# Patient Record
Sex: MALE | Race: WHITE | Employment: FULL TIME | URBAN - METROPOLITAN AREA
[De-identification: names, ages, dates, MRNs, and addresses within clinical notes are randomized per-mention and may not be internally consistent; named-entity substitution may affect disease eponyms.]

---

## 2018-03-13 ENCOUNTER — APPOINTMENT (OUTPATIENT)
Dept: CT IMAGING | Facility: CLINIC | Age: 53
End: 2018-03-13
Attending: PHYSICIAN ASSISTANT
Payer: COMMERCIAL

## 2018-03-13 ENCOUNTER — HOSPITAL ENCOUNTER (EMERGENCY)
Facility: CLINIC | Age: 53
Discharge: HOME | End: 2018-03-13
Attending: PHYSICIAN ASSISTANT | Admitting: PHYSICIAN ASSISTANT
Payer: COMMERCIAL

## 2018-03-13 ENCOUNTER — APPOINTMENT (OUTPATIENT)
Dept: ULTRASOUND IMAGING | Facility: CLINIC | Age: 53
End: 2018-03-13
Attending: PHYSICIAN ASSISTANT
Payer: COMMERCIAL

## 2018-03-13 VITALS
DIASTOLIC BLOOD PRESSURE: 97 MMHG | RESPIRATION RATE: 18 BRPM | WEIGHT: 185 LBS | OXYGEN SATURATION: 97 % | HEIGHT: 64 IN | TEMPERATURE: 98.6 F | SYSTOLIC BLOOD PRESSURE: 166 MMHG | BODY MASS INDEX: 31.58 KG/M2

## 2018-03-13 DIAGNOSIS — R19.7 DIARRHEA, UNSPECIFIED TYPE: ICD-10-CM

## 2018-03-13 DIAGNOSIS — R10.84 ABDOMINAL PAIN, GENERALIZED: ICD-10-CM

## 2018-03-13 LAB
ALBUMIN SERPL-MCNC: 3.7 G/DL (ref 3.4–5)
ALP SERPL-CCNC: 94 U/L (ref 40–150)
ALT SERPL W P-5'-P-CCNC: 37 U/L (ref 0–70)
ANION GAP SERPL CALCULATED.3IONS-SCNC: 7 MMOL/L (ref 3–14)
AST SERPL W P-5'-P-CCNC: 22 U/L (ref 0–45)
BASOPHILS # BLD AUTO: 0 10E9/L (ref 0–0.2)
BASOPHILS NFR BLD AUTO: 0.3 %
BILIRUB SERPL-MCNC: 0.2 MG/DL (ref 0.2–1.3)
BUN SERPL-MCNC: 18 MG/DL (ref 7–30)
CALCIUM SERPL-MCNC: 8.5 MG/DL (ref 8.5–10.1)
CHLORIDE SERPL-SCNC: 106 MMOL/L (ref 94–109)
CO2 SERPL-SCNC: 29 MMOL/L (ref 20–32)
CREAT SERPL-MCNC: 1.1 MG/DL (ref 0.66–1.25)
CRP SERPL-MCNC: 3.7 MG/L (ref 0–8)
DIFFERENTIAL METHOD BLD: NORMAL
EOSINOPHIL # BLD AUTO: 0.2 10E9/L (ref 0–0.7)
EOSINOPHIL NFR BLD AUTO: 1.7 %
ERYTHROCYTE [DISTWIDTH] IN BLOOD BY AUTOMATED COUNT: 13.5 % (ref 10–15)
GFR SERPL CREATININE-BSD FRML MDRD: 70 ML/MIN/1.7M2
GLUCOSE SERPL-MCNC: 97 MG/DL (ref 70–99)
HCT VFR BLD AUTO: 47.7 % (ref 40–53)
HGB BLD-MCNC: 15.6 G/DL (ref 13.3–17.7)
IMM GRANULOCYTES # BLD: 0 10E9/L (ref 0–0.4)
IMM GRANULOCYTES NFR BLD: 0.4 %
LIPASE SERPL-CCNC: 153 U/L (ref 73–393)
LYMPHOCYTES # BLD AUTO: 2.7 10E9/L (ref 0.8–5.3)
LYMPHOCYTES NFR BLD AUTO: 24.4 %
MCH RBC QN AUTO: 28.3 PG (ref 26.5–33)
MCHC RBC AUTO-ENTMCNC: 32.7 G/DL (ref 31.5–36.5)
MCV RBC AUTO: 86 FL (ref 78–100)
MONOCYTES # BLD AUTO: 0.9 10E9/L (ref 0–1.3)
MONOCYTES NFR BLD AUTO: 8.2 %
NEUTROPHILS # BLD AUTO: 7.1 10E9/L (ref 1.6–8.3)
NEUTROPHILS NFR BLD AUTO: 65 %
PLATELET # BLD AUTO: 224 10E9/L (ref 150–450)
POTASSIUM SERPL-SCNC: 4 MMOL/L (ref 3.4–5.3)
PROT SERPL-MCNC: 7 G/DL (ref 6.8–8.8)
RBC # BLD AUTO: 5.52 10E12/L (ref 4.4–5.9)
SODIUM SERPL-SCNC: 142 MMOL/L (ref 133–144)
WBC # BLD AUTO: 10.9 10E9/L (ref 4–11)

## 2018-03-13 PROCEDURE — 80053 COMPREHEN METABOLIC PANEL: CPT | Performed by: PHYSICIAN ASSISTANT

## 2018-03-13 PROCEDURE — 25000132 ZZH RX MED GY IP 250 OP 250 PS 637: Performed by: PHYSICIAN ASSISTANT

## 2018-03-13 PROCEDURE — 83690 ASSAY OF LIPASE: CPT | Performed by: PHYSICIAN ASSISTANT

## 2018-03-13 PROCEDURE — 87506 IADNA-DNA/RNA PROBE TQ 6-11: CPT | Performed by: PHYSICIAN ASSISTANT

## 2018-03-13 PROCEDURE — 25000128 H RX IP 250 OP 636: Performed by: PHYSICIAN ASSISTANT

## 2018-03-13 PROCEDURE — 99285 EMERGENCY DEPT VISIT HI MDM: CPT | Mod: 25 | Performed by: PHYSICIAN ASSISTANT

## 2018-03-13 PROCEDURE — 25000125 ZZHC RX 250: Performed by: PHYSICIAN ASSISTANT

## 2018-03-13 PROCEDURE — 76705 ECHO EXAM OF ABDOMEN: CPT

## 2018-03-13 PROCEDURE — 74177 CT ABD & PELVIS W/CONTRAST: CPT

## 2018-03-13 PROCEDURE — 86140 C-REACTIVE PROTEIN: CPT | Performed by: PHYSICIAN ASSISTANT

## 2018-03-13 PROCEDURE — 85025 COMPLETE CBC W/AUTO DIFF WBC: CPT | Performed by: PHYSICIAN ASSISTANT

## 2018-03-13 PROCEDURE — 99285 EMERGENCY DEPT VISIT HI MDM: CPT | Mod: Z6 | Performed by: PHYSICIAN ASSISTANT

## 2018-03-13 PROCEDURE — 87493 C DIFF AMPLIFIED PROBE: CPT | Performed by: PHYSICIAN ASSISTANT

## 2018-03-13 PROCEDURE — 87329 GIARDIA AG IA: CPT | Performed by: PHYSICIAN ASSISTANT

## 2018-03-13 RX ORDER — HYDROCODONE BITARTRATE AND ACETAMINOPHEN 5; 325 MG/1; MG/1
1 TABLET ORAL EVERY 6 HOURS PRN
Qty: 6 TABLET | Refills: 0 | Status: SHIPPED | OUTPATIENT
Start: 2018-03-13

## 2018-03-13 RX ORDER — IOPAMIDOL 755 MG/ML
100 INJECTION, SOLUTION INTRAVASCULAR ONCE
Status: COMPLETED | OUTPATIENT
Start: 2018-03-13 | End: 2018-03-13

## 2018-03-13 RX ADMIN — LIDOCAINE HYDROCHLORIDE 30 ML: 20 SOLUTION ORAL; TOPICAL at 18:12

## 2018-03-13 RX ADMIN — IOPAMIDOL 90 ML: 755 INJECTION, SOLUTION INTRAVENOUS at 19:02

## 2018-03-13 RX ADMIN — SODIUM CHLORIDE 60 ML: 9 INJECTION, SOLUTION INTRAVENOUS at 19:01

## 2018-03-13 ASSESSMENT — ENCOUNTER SYMPTOMS
NAUSEA: 1
ABDOMINAL PAIN: 1
BLOOD IN STOOL: 0
DIARRHEA: 1
FATIGUE: 1
FEVER: 0
CHILLS: 0

## 2018-03-13 NOTE — ED NOTES
Diarrhea for one month with on and off pain.  Seen at [facility] and referred to ED.

## 2018-03-13 NOTE — ED PROVIDER NOTES
"  History     Chief Complaint   Patient presents with     Abdominal Pain     The history is provided by the patient.     Nicholas Samaniego is a 52 year old male who presents to the emergency department with concerns of abdominal pain. He reports that for the last month he has had diarrhea with intermittent abdominal pain. He was prompted to come into the emergency department today because he was referred by his PCP. The pain has gotten \"10 times worse\" in the last 4 days. His pain is rated at 7-8/10, he has not taken anything other than his tylenol 650, but it upsets his stomach. Patient is fatigued and nauseated. He reports that he has not changed his diet. Before he had diarrhea 2-3 times a day and in the last 4 days he has had 4-5 bouts of diarrhea daily. Patient drink about a 6 pack of beer 5/7 days a week. He also smokes cigarettes daily. No fever or chills, but he does feel warm. No blood in the stool.    Problem List:    There are no active problems to display for this patient.       Past Medical History:    History reviewed. No pertinent past medical history.    Past Surgical History:    History reviewed. No pertinent surgical history.    Family History:    No family history on file.    Social History:  Marital Status:  Single [1]  Social History   Substance Use Topics     Smoking status: Current Every Day Smoker     Packs/day: 1.00     Smokeless tobacco: Never Used     Alcohol use Yes        Medications:      LISINOPRIL PO   METOPROLOL SUCCINATE ER PO   HYDROcodone-acetaminophen (NORCO) 5-325 MG per tablet   omeprazole (PRILOSEC) 20 MG CR capsule         Review of Systems   Constitutional: Positive for fatigue. Negative for chills and fever (although he does feel warm).   Gastrointestinal: Positive for abdominal pain, diarrhea and nausea. Negative for blood in stool.   All other systems reviewed and are negative.      Physical Exam   BP: (!) 153/104  Heart Rate: 86  Temp: 98.6  F (37  C)  Resp: 16  Height: " "162.6 cm (5' 4\")  Weight: 83.9 kg (185 lb)  SpO2: 97 %      Physical Exam   Constitutional: He is oriented to person, place, and time. No distress.   HENT:   Head: Normocephalic and atraumatic.   Right Ear: External ear normal.   Left Ear: External ear normal.   Nose: Nose normal.   Mouth/Throat: Oropharynx is clear and moist. No oropharyngeal exudate.   Eyes: Conjunctivae and EOM are normal. Pupils are equal, round, and reactive to light. Right eye exhibits no discharge. Left eye exhibits no discharge. No scleral icterus.   Neck: Normal range of motion. Neck supple.   Cardiovascular: Normal rate, regular rhythm, normal heart sounds and intact distal pulses.    No murmur heard.  Pulmonary/Chest: Effort normal and breath sounds normal. No respiratory distress. He has no wheezes. He has no rales. He exhibits no tenderness.   Abdominal: Soft. Bowel sounds are normal. He exhibits no distension and no mass. There is tenderness (RUQ and epigastric area.). There is no rebound and no guarding.   Musculoskeletal: Normal range of motion. He exhibits no edema or tenderness.   Lymphadenopathy:     He has no cervical adenopathy.   Neurological: He is alert and oriented to person, place, and time. No cranial nerve deficit.   Skin: Skin is warm and dry. He is not diaphoretic. No erythema. No pallor.   Psychiatric: He has a normal mood and affect. His behavior is normal. Thought content normal.   Nursing note and vitals reviewed.      ED Course     ED Course     Procedures               Critical Care time:  none              Results for orders placed or performed during the hospital encounter of 03/13/18   CT Abdomen Pelvis w Contrast    Narrative    CT ABDOMEN AND PELVIS WITH CONTRAST 3/13/2018 7:12 PM     HISTORY: Upper abdominal pain, diarrhea x 1 month.      TECHNIQUE: Axial images from the lung bases to the symphysis are  performed with additional coronal reformatted images. 90 mL of Isovue  370 are given intravenously.  " Radiation dose for this scan was reduced  using automated exposure control, adjustment of the mA and/or kV  according to patient size, or iterative reconstruction technique.    FINDINGS:     The lung bases are clear.    Abdomen: The liver, spleen, decompressed gallbladder, pancreas,  adrenal glands and kidneys are unremarkable. No hydronephrosis. No  enlarged lymph nodes. Patient has a circumaortic left renal vein which  is a normal anatomic variant. Aorta demonstrates calcified and  noncalcified plaque. The bowel is unremarkable. No obstruction,  diverticulitis or appendicitis.    Pelvis: The bladder, prostate and rectum are unremarkable. No enlarged  pelvic lymph nodes or free fluid. Bone window examination is  unremarkable.      Impression    IMPRESSION: No evidence of bowel obstruction, diverticulitis or  appendicitis. No CT findings to suggest colitis. Abdominal and pelvic  organs are within normal limits.    RODRIGO GAUTHIER MD   US Abdomen Limited    Narrative    US ABDOMEN LIMITED 3/13/2018 8:55 PM     HISTORY: RUQ pain.      FINDINGS:  Liver is increased in echogenicity without focal lesions.  The gallbladder is decompressed without evidence of shadowing  gallstones. No pericholecystic fluid. Common bile duct is normal in  diameter. Pancreas is normal where visualized. Examination of the  right kidney is unremarkable.      Impression    IMPRESSION:  Fatty infiltration of liver. Gallbladder is decompressed.  No obvious gallstones or bile duct dilatation.    RODRIGO GAUTHIER MD   CBC with platelets differential   Result Value Ref Range    WBC 10.9 4.0 - 11.0 10e9/L    RBC Count 5.52 4.4 - 5.9 10e12/L    Hemoglobin 15.6 13.3 - 17.7 g/dL    Hematocrit 47.7 40.0 - 53.0 %    MCV 86 78 - 100 fl    MCH 28.3 26.5 - 33.0 pg    MCHC 32.7 31.5 - 36.5 g/dL    RDW 13.5 10.0 - 15.0 %    Platelet Count 224 150 - 450 10e9/L    Diff Method Automated Method     % Neutrophils 65.0 %    % Lymphocytes 24.4 %    % Monocytes 8.2 %    %  Eosinophils 1.7 %    % Basophils 0.3 %    % Immature Granulocytes 0.4 %    Absolute Neutrophil 7.1 1.6 - 8.3 10e9/L    Absolute Lymphocytes 2.7 0.8 - 5.3 10e9/L    Absolute Monocytes 0.9 0.0 - 1.3 10e9/L    Absolute Eosinophils 0.2 0.0 - 0.7 10e9/L    Absolute Basophils 0.0 0.0 - 0.2 10e9/L    Abs Immature Granulocytes 0.0 0 - 0.4 10e9/L   Comprehensive metabolic panel   Result Value Ref Range    Sodium 142 133 - 144 mmol/L    Potassium 4.0 3.4 - 5.3 mmol/L    Chloride 106 94 - 109 mmol/L    Carbon Dioxide 29 20 - 32 mmol/L    Anion Gap 7 3 - 14 mmol/L    Glucose 97 70 - 99 mg/dL    Urea Nitrogen 18 7 - 30 mg/dL    Creatinine 1.10 0.66 - 1.25 mg/dL    GFR Estimate 70 >60 mL/min/1.7m2    GFR Estimate If Black 84 >60 mL/min/1.7m2    Calcium 8.5 8.5 - 10.1 mg/dL    Bilirubin Total 0.2 0.2 - 1.3 mg/dL    Albumin 3.7 3.4 - 5.0 g/dL    Protein Total 7.0 6.8 - 8.8 g/dL    Alkaline Phosphatase 94 40 - 150 U/L    ALT 37 0 - 70 U/L    AST 22 0 - 45 U/L   Lipase   Result Value Ref Range    Lipase 153 73 - 393 U/L   CRP inflammation   Result Value Ref Range    CRP Inflammation 3.7 0.0 - 8.0 mg/L        Results for orders placed or performed during the hospital encounter of 03/13/18 (from the past 24 hour(s))   CBC with platelets differential   Result Value Ref Range    WBC 10.9 4.0 - 11.0 10e9/L    RBC Count 5.52 4.4 - 5.9 10e12/L    Hemoglobin 15.6 13.3 - 17.7 g/dL    Hematocrit 47.7 40.0 - 53.0 %    MCV 86 78 - 100 fl    MCH 28.3 26.5 - 33.0 pg    MCHC 32.7 31.5 - 36.5 g/dL    RDW 13.5 10.0 - 15.0 %    Platelet Count 224 150 - 450 10e9/L    Diff Method Automated Method     % Neutrophils 65.0 %    % Lymphocytes 24.4 %    % Monocytes 8.2 %    % Eosinophils 1.7 %    % Basophils 0.3 %    % Immature Granulocytes 0.4 %    Absolute Neutrophil 7.1 1.6 - 8.3 10e9/L    Absolute Lymphocytes 2.7 0.8 - 5.3 10e9/L    Absolute Monocytes 0.9 0.0 - 1.3 10e9/L    Absolute Eosinophils 0.2 0.0 - 0.7 10e9/L    Absolute Basophils 0.0 0.0 - 0.2  10e9/L    Abs Immature Granulocytes 0.0 0 - 0.4 10e9/L   Comprehensive metabolic panel   Result Value Ref Range    Sodium 142 133 - 144 mmol/L    Potassium 4.0 3.4 - 5.3 mmol/L    Chloride 106 94 - 109 mmol/L    Carbon Dioxide 29 20 - 32 mmol/L    Anion Gap 7 3 - 14 mmol/L    Glucose 97 70 - 99 mg/dL    Urea Nitrogen 18 7 - 30 mg/dL    Creatinine 1.10 0.66 - 1.25 mg/dL    GFR Estimate 70 >60 mL/min/1.7m2    GFR Estimate If Black 84 >60 mL/min/1.7m2    Calcium 8.5 8.5 - 10.1 mg/dL    Bilirubin Total 0.2 0.2 - 1.3 mg/dL    Albumin 3.7 3.4 - 5.0 g/dL    Protein Total 7.0 6.8 - 8.8 g/dL    Alkaline Phosphatase 94 40 - 150 U/L    ALT 37 0 - 70 U/L    AST 22 0 - 45 U/L   Lipase   Result Value Ref Range    Lipase 153 73 - 393 U/L   CRP inflammation   Result Value Ref Range    CRP Inflammation 3.7 0.0 - 8.0 mg/L   CT Abdomen Pelvis w Contrast    Narrative    CT ABDOMEN AND PELVIS WITH CONTRAST 3/13/2018 7:12 PM     HISTORY: Upper abdominal pain, diarrhea x 1 month.      TECHNIQUE: Axial images from the lung bases to the symphysis are  performed with additional coronal reformatted images. 90 mL of Isovue  370 are given intravenously.  Radiation dose for this scan was reduced  using automated exposure control, adjustment of the mA and/or kV  according to patient size, or iterative reconstruction technique.    FINDINGS:     The lung bases are clear.    Abdomen: The liver, spleen, decompressed gallbladder, pancreas,  adrenal glands and kidneys are unremarkable. No hydronephrosis. No  enlarged lymph nodes. Patient has a circumaortic left renal vein which  is a normal anatomic variant. Aorta demonstrates calcified and  noncalcified plaque. The bowel is unremarkable. No obstruction,  diverticulitis or appendicitis.    Pelvis: The bladder, prostate and rectum are unremarkable. No enlarged  pelvic lymph nodes or free fluid. Bone window examination is  unremarkable.      Impression    IMPRESSION: No evidence of bowel obstruction,  diverticulitis or  appendicitis. No CT findings to suggest colitis. Abdominal and pelvic  organs are within normal limits.    RODRIGO GAUTHIER MD   US Abdomen Limited    Narrative    US ABDOMEN LIMITED 3/13/2018 8:55 PM     HISTORY: RUQ pain.      FINDINGS:  Liver is increased in echogenicity without focal lesions.  The gallbladder is decompressed without evidence of shadowing  gallstones. No pericholecystic fluid. Common bile duct is normal in  diameter. Pancreas is normal where visualized. Examination of the  right kidney is unremarkable.      Impression    IMPRESSION:  Fatty infiltration of liver. Gallbladder is decompressed.  No obvious gallstones or bile duct dilatation.    RODRIGO GAUTHIER MD       Medications   lidocaine (viscous) (XYLOCAINE) 2 % 15 mL, alum & mag hydroxide-simethicone (MYLANTA ES/MAALOX  ES) 15 mL GI Cocktail (30 mLs Oral Given 3/13/18 1812)   sodium chloride (PF) 0.9% PF flush 3 mL (3 mLs Intravenous Given 3/13/18 1901)   sodium chloride 0.9 % bag 500mL for CT scan flush use (60 mLs As instructed Given 3/13/18 1901)   iopamidol (ISOVUE-370) solution 100 mL (90 mLs Intravenous Given 3/13/18 1902)       Assessments & Plan (with Medical Decision Making)     Abdominal pain, generalized  Diarrhea, unspecified type     52 year old male presents for evaluation of diarrhea for the past 1 month much worse in the past 4 days with now having diarrhea upwards of 7 times per day. No blood or pus in the stool. She has now started to develop right upper quadrant and epigastric abdominal discomfort associated with some nausea.  Patient does drink 6 cans of beer per day per his report. Denies fever, chills, or vomiting.  On exam blood pressure 166/97, pulse 88, temperature 98.6. Right upper quadrant and epigastric abdominal discomfort without rebound or guarding. Remainder of the exam was normal. Laboratory levels display a white blood count of 10,900, hemoglobin 15.6. Comprehensive metabolic panel was completely  normal. Lipase normal at 153 and CRP normal at 3.7. CT of the abdomen/pelvis displayed no abnormalities. Right upper quadrant ultrasound with a contracted gallbladder but no gallstones appreciated. Patient was given a GI cocktail with some improvement in his symptoms. I offered pain medication, but the patient could not find a ride. She may have gastritis or early PUD given that he did have some relief of his symptoms with the GI cocktail. Trial of omeprazole therapy initiated. Stool was collected here in the ED for stool studies to include stool culture, C. difficile toxin, and Giardia antigen testing. I recommended follow-up with his PCP in 3 days to review his status and also to go over stool study results to determine further treatment. I encouraged him to decrease his alcohol use if at all possible. Push clear fluids.  I did give him #6 tablets of hydrocodone to use as needed for breakthrough pain, as he did request something to use so that he could sleep better.  Note was provided for work at his request. Return to the ED if symptoms are worsening or changing. The patient was in agreement with this plan and was suitable for discharge.      I have reviewed the nursing notes.    I have reviewed the findings, diagnosis, plan and need for follow up with the patient.       Discharge Medication List as of 3/13/2018  9:49 PM      START taking these medications    Details   HYDROcodone-acetaminophen (NORCO) 5-325 MG per tablet Take 1 tablet by mouth every 6 hours as needed for moderate to severe pain, Disp-6 tablet, R-0, Local Print      omeprazole (PRILOSEC) 20 MG CR capsule Take 1 capsule (20 mg) by mouth daily, Disp-30 capsule, R-0, Local Print             Final diagnoses:   Abdominal pain, generalized   Diarrhea, unspecified type     This document serves as a record of services personally performed by Wilbert Akers PA-C. It was created on their behalf by Ashley Hernandez, a trained medical scribe. The  creation of this record is based on the provider's personal observations and the statements of the patient. This document has been checked and approved by the attending provider.  Note: Chart documentation done in part with Dragon Voice Recognition software. Although reviewed after completion, some word and grammatical errors may remain.  3/13/2018   Wilbert Akers PA-C   [facility] EMERGENCY DEPARTMENT     Wilbert Akers PA-C  03/14/18 0210

## 2018-03-13 NOTE — ED AVS SNAPSHOT
[facility] Emergency Department    [address] [place] [address]    Phone:  [phone]    Fax:  [phone]                                       Nicholas Samaniego   MRN: [id number]    Department:  [facility] Emergency Department   Date of Visit:  3/13/2018           After Visit Summary Signature Page     I have received my discharge instructions, and my questions have been answered. I have discussed any challenges I see with this plan with the nurse or doctor.    ..........................................................................................................................................  Patient/Patient Representative Signature      ..........................................................................................................................................  Patient Representative Print Name and Relationship to Patient    ..................................................               ................................................  Date                                            Time    ..........................................................................................................................................  Reviewed by Signature/Title    ...................................................              ..............................................  Date                                                            Time

## 2018-03-13 NOTE — LETTER
March 13, 2018      To Whom It May Concern:      Nicholas Samaniego was seen in our Emergency Department today, 03/13/18.  I expect his condition to improve over the next few days.  He may return to work when improved.  Please excuse him on 3/13/2018 and 3/14/2018 due to his health condition.    Sincerely,          Wilbert Akers PA-C

## 2018-03-13 NOTE — ED AVS SNAPSHOT
[facility] Emergency Department    [address] [place] [address]    Phone:  [phone]    Fax:  [phone]                                       Nicholas Samaniego   MRN: [id number]    Department:  [facility] Emergency Department   Date of Visit:  3/13/2018           Patient Information     Date Of Birth          1965        Your diagnoses for this visit were:     Abdominal pain, generalized     Diarrhea, unspecified type        You were seen by Wilbert Akers PA-C.      Follow-up Information     Follow up with Juliette Pride RN.    Specialty:  Family Practice    Why:  For ED recheck    Contact information:    [facility]  [address] [facility] [address]  [phone]          Discharge Instructions       It was a pleasure working with you today.  I hope your condition improves rapidly!    Please start the Omeprazole ( antacid ) once daily to see if this helps your epigastric ( upper abdominal ) pain.     Please call to see your primary care provider on Friday or Monday to recheck your condition.  Your PCP will be able to look up your stool studies at that time.          24 Hour Appointment Hotline       To make an appointment at any [facility] clinic, call [phone] ([phone]). If you don't have a family doctor or clinic, we will help you find one. [facility] clinics are conveniently located to serve the needs of you and your family.             Review of your medicines      START taking        Dose / Directions Last dose taken    HYDROcodone-acetaminophen 5-325 MG per tablet   Commonly known as:  NORCO   Dose:  1 tablet   Quantity:  6 tablet        Take 1 tablet by mouth every 6 hours as needed for moderate to severe pain   Refills:  0        omeprazole 20 MG CR capsule   Commonly known as:  priLOSEC   Dose:  20 mg   Quantity:  30 capsule        Take 1 capsule (20 mg) by mouth daily   Refills:  0          Our records show that you are taking the  medicines listed below. If these are incorrect, please call your family doctor or clinic.        Dose / Directions Last dose taken    LISINOPRIL PO   Dose:  20 mg        Take 20 mg by mouth   Refills:  0        METOPROLOL SUCCINATE ER PO   Dose:  25 mg        Take 25 mg by mouth daily   Refills:  0                Prescriptions were sent or printed at these locations (2 Prescriptions)                   [facility]   [address] [place] [id number]    Telephone:  [phone]   Fax:  [phone]   Hours:                  Printed at Department/Unit printer (2 of 2)         HYDROcodone-acetaminophen (NORCO) 5-325 MG per tablet               omeprazole (PRILOSEC) 20 MG CR capsule                Procedures and tests performed during your visit     CBC with platelets differential    CRP inflammation    CT Abdomen Pelvis w Contrast    Clostridium difficile toxin B PCR    Comprehensive metabolic panel    Enteric Bacteria and Virus Panel by NIKOS Stool    Giardia antigen    Give 20 ounces of water 15 minutes before CT of abdomen    Lipase    Peripheral IV catheter    US Abdomen Limited      Orders Needing Specimen Collection     None      Pending Results     Date and Time Order Name Status Description    3/13/2018 1939 Giardia antigen In process     3/13/2018 1939 Clostridium difficile toxin B PCR In process     3/13/2018 1939 Enteric Bacteria and Virus Panel by NIKOS Stool In process             Pending Culture Results     Date and Time Order Name Status Description    3/13/2018 1939 Giardia antigen In process     3/13/2018 1939 Clostridium difficile toxin B PCR In process     3/13/2018 1939 Enteric Bacteria and Virus Panel by NIKOS Stool In process             Pending Results Instructions     If you had any lab results that were not finalized at the time of your Discharge, you can call the ED Lab Result RN at [phone]. You will be contacted by this team for any positive Lab results or changes in treatment.  "The nurses are available 7 days a week from 10A to 6:30P.  You can leave a message 24 hours per day and they will return your call.        Thank you for choosing [facility]       Thank you for choosing [facility] for your care. Our goal is always to provide you with excellent care. Hearing back from our patients is one way we can continue to improve our services. Please take a few minutes to complete the written survey that you may receive in the mail after you visit with us. Thank you!        [employer] Information     [employer] lets you send messages to your doctor, view your test results, renew your prescriptions, schedule appointments and more. To sign up, go to [email or web address] . Click on \"Log in\" on the left side of the screen, which will take you to the Welcome page. Then click on \"Sign up Now\" on the right side of the page.     You will be asked to enter the access code listed below, as well as some personal information. Please follow the directions to create your username and password.     Your access code is: [email or web address]  Expires: 2018  9:49 PM     Your access code will  in 90 days. If you need help or a new code, please call your [facility] clinic or [phone].        Care EveryWhere ID     This is your Care EveryWhere ID. This could be used by other organizations to access your [facility] medical records  [id number]        Equal Access to Services     MAGNUS PORTILLO : Hadii jennifer Sinclair, waaxda corina, qaybta michellealjeff campbell, kirit zarco . So [facility] [phone].    ATENCIÓN: Si habla español, tiene a conteh disposición servicios gratuitos de asistencia lingüística. Hellen al [phone].    We comply with applicable federal civil rights laws and [place] laws. We do not discriminate on the basis of race, color, national origin, age, disability, sex, sexual orientation, or gender identity.            After Visit Summary       This is your record. Keep this " with you and show to your community pharmacist(s) and doctor(s) at your next visit.

## 2018-03-14 LAB
C COLI+JEJUNI+LARI FUSA STL QL NAA+PROBE: NOT DETECTED
C DIFF TOX B STL QL: NEGATIVE
EC STX1 GENE STL QL NAA+PROBE: NOT DETECTED
EC STX2 GENE STL QL NAA+PROBE: NOT DETECTED
ENTERIC PATHOGEN COMMENT: NORMAL
G LAMBLIA AG STL QL IA: NORMAL
NOROV GI+II ORF1-ORF2 JNC STL QL NAA+PR: NOT DETECTED
RVA NSP5 STL QL NAA+PROBE: NOT DETECTED
SALMONELLA SP RPOD STL QL NAA+PROBE: NOT DETECTED
SHIGELLA SP+EIEC IPAH STL QL NAA+PROBE: NOT DETECTED
SPECIMEN SOURCE: NORMAL
SPECIMEN SOURCE: NORMAL
V CHOL+PARA RFBL+TRKH+TNAA STL QL NAA+PR: NOT DETECTED
Y ENTERO RECN STL QL NAA+PROBE: NOT DETECTED

## 2018-03-14 NOTE — DISCHARGE INSTRUCTIONS
It was a pleasure working with you today.  I hope your condition improves rapidly!    Please start the Omeprazole ( antacid ) once daily to see if this helps your epigastric ( upper abdominal ) pain.     Please call to see your primary care provider on Friday or Monday to recheck your condition.  Your PCP will be able to look up your stool studies at that time.

## 2021-01-01 ENCOUNTER — HOSPITAL ENCOUNTER (EMERGENCY)
Facility: CLINIC | Age: 56
Discharge: HOME | End: 2021-11-14
Attending: FAMILY MEDICINE | Admitting: FAMILY MEDICINE
Payer: COMMERCIAL

## 2021-01-01 ENCOUNTER — APPOINTMENT (OUTPATIENT)
Dept: GENERAL RADIOLOGY | Facility: CLINIC | Age: 56
End: 2021-01-01
Attending: FAMILY MEDICINE
Payer: COMMERCIAL

## 2021-01-01 VITALS
HEART RATE: 88 BPM | DIASTOLIC BLOOD PRESSURE: 114 MMHG | OXYGEN SATURATION: 95 % | RESPIRATION RATE: 20 BRPM | SYSTOLIC BLOOD PRESSURE: 179 MMHG | WEIGHT: 185 LBS | BODY MASS INDEX: 31.76 KG/M2 | TEMPERATURE: 98.7 F

## 2021-01-01 DIAGNOSIS — J20.9 ACUTE BRONCHITIS, UNSPECIFIED ORGANISM: ICD-10-CM

## 2021-01-01 PROCEDURE — 99284 EMERGENCY DEPT VISIT MOD MDM: CPT | Performed by: FAMILY MEDICINE

## 2021-01-01 PROCEDURE — 71045 X-RAY EXAM CHEST 1 VIEW: CPT

## 2021-01-01 PROCEDURE — 99283 EMERGENCY DEPT VISIT LOW MDM: CPT | Mod: 25 | Performed by: FAMILY MEDICINE

## 2021-01-01 RX ORDER — PREDNISONE 20 MG/1
40 TABLET ORAL DAILY
Qty: 10 TABLET | Refills: 0 | Status: SHIPPED | OUTPATIENT
Start: 2021-01-01

## 2021-11-14 NOTE — ED PROVIDER NOTES
History     Chief Complaint   Patient presents with     Cough     HPI  Nicholas Samaniego is a 56 year old male who presents with concerns of a continued cough.  Patient was seen in urgent care a couple of days ago and diagnosed with bronchitis.  Patient had a Covid test which was negative.  Patient was treated with a course of azithromycin but was not getting any better.  Patient is coming in requesting to get an x-ray.  Denies any nausea any vomiting.  Denies any recent diarrhea.  Nothing makes his symptoms better or worse.  Denies any dysuria or hematuria.    Allergies:  Allergies   Allergen Reactions     Hydrochlorothiazide Nausea and Vomiting     upset stomach     Varenicline Other (See Comments)     nightmares       Problem List:    There are no problems to display for this patient.       Past Medical History:    No past medical history on file.    Past Surgical History:    No past surgical history on file.    Family History:    No family history on file.    Social History:  Marital Status:  Single [1]  Social History     Tobacco Use     Smoking status: Current Every Day Smoker     Packs/day: 1.00     Smokeless tobacco: Never Used   Substance Use Topics     Alcohol use: Yes     Drug use: Yes     Types: Marijuana     Comment: recent use        Medications:    predniSONE (DELTASONE) 20 MG tablet  HYDROcodone-acetaminophen (NORCO) 5-325 MG per tablet  LISINOPRIL PO  METOPROLOL SUCCINATE ER PO          Review of Systems   All other systems reviewed and are negative.      Physical Exam   BP: (!) 160/98  Pulse: 98  Temp: 98.7  F (37.1  C)  Resp: 20  Weight: 83.9 kg (185 lb)  SpO2: 98 %      Physical Exam  Vitals and nursing note reviewed.   Constitutional:       General: He is not in acute distress.     Appearance: He is well-developed. He is not diaphoretic.   HENT:      Head: Normocephalic and atraumatic.   Eyes:      Conjunctiva/sclera: Conjunctivae normal.   Cardiovascular:      Rate and Rhythm: Normal rate and  regular rhythm.      Heart sounds: Normal heart sounds. No murmur heard.      Pulmonary:      Effort: Pulmonary effort is normal. No respiratory distress.      Breath sounds: No stridor. Wheezing present.   Abdominal:      General: Bowel sounds are normal. There is no distension.      Palpations: Abdomen is soft.      Tenderness: There is no abdominal tenderness. There is no guarding.   Musculoskeletal:         General: Normal range of motion.      Cervical back: Normal range of motion.   Skin:     General: Skin is warm and dry.      Findings: No rash.   Neurological:      Mental Status: He is alert and oriented to person, place, and time.   Psychiatric:         Judgment: Judgment normal.         ED Course        Procedures      No results found for this or any previous visit (from the past 24 hour(s)).    Medications - No data to display     Chest x-ray was read by me and appears to be normal, no obvious pneumonia.    With patient having significant wheezing, I think patient does have bronchitis and COPD and is having a flare of this.  Would recommend treatment with a short course of steroids.  I do not think we need to do another course of antibiotics at this point.  Patient will be prescribed 40 mg daily for the next 5 days.  Patient will follow up if there is no improvement over the next few days.    Assessments & Plan (with Medical Decision Making)  Acute bronchitis     I have reviewed the nursing notes.    I have reviewed the findings, diagnosis, plan and need for follow up with the patient.      New Prescriptions    PREDNISONE (DELTASONE) 20 MG TABLET    Take 2 tablets (40 mg) by mouth daily       Final diagnoses:   Acute bronchitis, unspecified organism       11/14/2021   [facility] EMERGENCY DEPT     Elton Viera MD  11/14/21 [id number]

## 2021-11-14 NOTE — ED TRIAGE NOTES
Presents to ED with concerns of pneumonia. Was tested for COVID 11/6 and was negative. Was placed on Zpack and has not improved. Patient is a smoker.

## 2021-11-14 NOTE — LETTER
November 14, 2021      To Whom It May Concern:      Nicholas Samaniego was seen in our Emergency Department today, 11/14/21.  I expect his condition to improve over the next 3 days.  He may return to work/school when improved.    Sincerely,        Juan Manuel Patel MD